# Patient Record
Sex: MALE | Race: BLACK OR AFRICAN AMERICAN | NOT HISPANIC OR LATINO | Employment: FULL TIME | URBAN - METROPOLITAN AREA
[De-identification: names, ages, dates, MRNs, and addresses within clinical notes are randomized per-mention and may not be internally consistent; named-entity substitution may affect disease eponyms.]

---

## 2019-03-17 ENCOUNTER — HOSPITAL ENCOUNTER (EMERGENCY)
Facility: HOSPITAL | Age: 29
Discharge: HOME | End: 2019-03-17
Attending: EMERGENCY MEDICINE
Payer: COMMERCIAL

## 2019-03-17 VITALS
SYSTOLIC BLOOD PRESSURE: 112 MMHG | OXYGEN SATURATION: 100 % | WEIGHT: 180 LBS | BODY MASS INDEX: 24.38 KG/M2 | TEMPERATURE: 99 F | HEART RATE: 56 BPM | HEIGHT: 72 IN | RESPIRATION RATE: 18 BRPM | DIASTOLIC BLOOD PRESSURE: 63 MMHG

## 2019-03-17 DIAGNOSIS — M20.011 ACQUIRED MALLET FINGER, RIGHT: Primary | ICD-10-CM

## 2019-03-17 DIAGNOSIS — S69.91XA INJURY OF RIGHT RING FINGER, INITIAL ENCOUNTER: ICD-10-CM

## 2019-03-17 PROCEDURE — 99284 EMERGENCY DEPT VISIT MOD MDM: CPT | Mod: 25

## 2019-03-17 PROCEDURE — 63600175 PHARM REV CODE 636 W HCPCS: Performed by: NURSE PRACTITIONER

## 2019-03-17 PROCEDURE — 29130 APPL FINGER SPLINT STATIC: CPT | Mod: RT

## 2019-03-17 RX ORDER — KETOROLAC TROMETHAMINE 30 MG/ML
30 INJECTION, SOLUTION INTRAMUSCULAR; INTRAVENOUS
Status: COMPLETED | OUTPATIENT
Start: 2019-03-17 | End: 2019-03-17

## 2019-03-17 RX ORDER — NAPROXEN 500 MG/1
500 TABLET ORAL EVERY 12 HOURS PRN
Qty: 15 TABLET | Refills: 0 | Status: SHIPPED | OUTPATIENT
Start: 2019-03-17

## 2019-03-17 RX ADMIN — KETOROLAC TROMETHAMINE 30 MG: 30 INJECTION, SOLUTION INTRAMUSCULAR; INTRAVENOUS at 08:03

## 2019-03-18 NOTE — ED TRIAGE NOTES
Pt here pt for right ring (4th) finger injury. Reports he was playing football, went to catch the ball, and dropped it. After he noticed his finger tip bent down. Unable to extend finger out all the way. Denies any pain at time of injury, no pain present now. Pt able to move extremity without pain.

## 2019-03-18 NOTE — ED PROVIDER NOTES
Encounter Date: 3/17/2019    This is a SORT/MSE of a 28 y.o. male presenting to the ED with c/o pain and injury to the right ring finger. DIP joint flexed. Able to extend it but flexes when let go. Care will be transferred to an alternate provider when patient is roomed for a full evaluation and final disposition. ALEXIS Alexander, FNP-MARIANNE 3/17/2019 8:16 PM       History     Chief Complaint   Patient presents with    Hand Injury     Pt was playing football and injured his R 5th digit. Pt unable to extend the finger.     28-year-old male presenting for evaluation of an injury to his right 4th digit.  Injury occurred while playing sports prior to arrival.  Patient states that he cannot extend his 4th finger at the DIP joint.  No additional injuries or pain to the right hand, right wrist, right elbow.  He denies head injury. No medications taken prior to arrival.          Review of patient's allergies indicates:  No Known Allergies  History reviewed. No pertinent past medical history.  History reviewed. No pertinent surgical history.  History reviewed. No pertinent family history.  Social History     Tobacco Use    Smoking status: Never Smoker    Smokeless tobacco: Never Used   Substance Use Topics    Alcohol use: Yes    Drug use: No     Review of Systems   Constitutional: Negative for chills and fever.   HENT: Negative for congestion, postnasal drip, rhinorrhea, sinus pressure and sore throat.    Eyes: Negative for pain and redness.   Respiratory: Negative for apnea, cough, choking, chest tightness, shortness of breath, wheezing and stridor.    Cardiovascular: Negative for chest pain, palpitations and leg swelling.   Gastrointestinal: Negative for abdominal pain, diarrhea, nausea and vomiting.   Genitourinary: Negative for dysuria, flank pain, penile pain and urgency.   Musculoskeletal: Positive for arthralgias (Right 4th finger). Negative for back pain, gait problem, joint swelling, myalgias, neck pain and neck  stiffness.   Skin: Negative for color change, pallor, rash and wound.   Neurological: Negative for dizziness, tremors, seizures, syncope and light-headedness.   Psychiatric/Behavioral: Negative for confusion. The patient is not nervous/anxious.        Physical Exam     Initial Vitals [03/17/19 2018]   BP Pulse Resp Temp SpO2   114/64 66 18 97.4 °F (36.3 °C) 97 %      MAP       --         Physical Exam    Vitals reviewed.  Constitutional: Vital signs are normal. He appears well-developed and well-nourished. He is not diaphoretic. He is active and cooperative.  Non-toxic appearance. He does not have a sickly appearance. He does not appear ill. No distress.   Pleasant, well appearing, in no distress   HENT:   Head: Normocephalic and atraumatic.   Right Ear: External ear normal.   Left Ear: External ear normal.   Nose: Nose normal.   Eyes: EOM are normal. Right eye exhibits no discharge. Left eye exhibits no discharge.   Neck: Normal range of motion. Neck supple. No tracheal deviation present.   Cardiovascular: Normal rate.   Pulmonary/Chest: No stridor. No respiratory distress.   Abdominal: Soft. He exhibits no distension. There is no tenderness.   Musculoskeletal: Normal range of motion. He exhibits no tenderness.        Right hand: Right ring finger: Injuries: tendon injury (Unable to extend right 4th finger at the DIP joint; flexion is normal. Capillary refill is brisk.  Tenderness overlying the DIP joint.).   Neurological: He is alert and oriented to person, place, and time. He has normal strength. No cranial nerve deficit.   Skin: Skin is warm and dry.   Psychiatric: He has a normal mood and affect. His behavior is normal. Judgment and thought content normal.         ED Course   Splint Application  Date/Time: 3/17/2019 9:18 PM  Performed by: Alex Boss NP  Authorized by: William Arevalo MD   Consent Done: Yes  Consent: Verbal consent obtained.  Risks and benefits: risks, benefits and alternatives were  "discussed  Consent given by: patient  Patient understanding: patient states understanding of the procedure being performed  Patient consent: the patient's understanding of the procedure matches consent given  Procedure consent: procedure consent matches procedure scheduled  Imaging studies: imaging studies available  Required items: required blood products, implants, devices, and special equipment available  Patient identity confirmed: , name, verbally with patient, provided demographic data and MRN  Time out: Immediately prior to procedure a "time out" was called to verify the correct patient, procedure, equipment, support staff and site/side marked as required.  Location details: right ring finger  Splint type: static finger  Supplies used: aluminum splint  Post-procedure: The splinted body part was neurovascularly unchanged following the procedure.  Patient tolerance: Patient tolerated the procedure well with no immediate complications        Labs Reviewed - No data to display       Imaging Results          X-Ray Finger 2 or More Views Right (Final result)  Result time 19 20:39:29    Final result by Kenyon Rojas MD (19 20:39:29)                 Impression:      No acute osseous abnormality identified.      Electronically signed by: Kenyon Rojas MD  Date:    2019  Time:    20:39             Narrative:    EXAMINATION:  XR FINGER 2 OR MORE VIEWS RIGHT    CLINICAL HISTORY:  finger injury;    COMPARISON:  None    FINDINGS:  No evidence of acute displaced fracture, dislocation, or osseous destructive process.  There is mild flexion seen at the 4th finger DIP joint.  No radiopaque retained foreign body seen.                                 Medical Decision Making:   History:   Old Medical Records: I decided to obtain old medical records.  Differential Diagnosis:   Fracture, dislocation, neurovascular compromise, mallet finger, flexor tenosynovitis, others  Clinical Tests:   Radiological " Study: Ordered and Reviewed  ED Management:  28-year-old male presenting for evaluation of an injury to the right 4th finger that occurred while playing sports prior to arrival.  Patient states that he cannot actively extend the distal phalanx of the right 4th finger.  Finger held in slight flexion at the affected DIP joint.  X-ray shows no evidence of fracture, dislocation, subluxation, or other acute osseous abnormality.  Findings are clinically consistent with a mallet finger.  Applied splint with finger extended at the affected joint.  See procedure note. Advised patient to follow up with Orthopedics for re-evaluation and further management.  ED return precautions given. All questions regarding diagnosis and plan were answered to the patient's fullest possible satisfaction. Patient expressed understanding of diagnosis, discharge instructions, and return precautions.    My attending physician was available for consultation during this case                      Clinical Impression:       ICD-10-CM ICD-9-CM   1. Acquired mallet finger, right M20.011 736.1   2. Injury of right ring finger, initial encounter S69.91XA 959.5         Disposition:   Disposition: Discharged  Condition: Stable                         Alxe Boss NP  03/19/19 [id number]

## 2019-03-18 NOTE — DISCHARGE INSTRUCTIONS
Scheduled appointment with Orthopedics as discussed for further treatment and testing.  Take pain medication as needed only as prescribed.  Return to the emergency department for any new or worsening symptoms or as needed for any additional concerns.    Thank you for coming to our Emergency Department today. It is important to remember that some problems are difficult to diagnose and may not be found during your first visit. Be sure to follow up with your primary care doctor.  If you do not have one, you may contact the one listed on your discharge paperwork or you may also call the [facility] Appointment Desk at [phone] to schedule an appointment with one.     Return to the ER with any questions/concerns, new/concerning symptoms, worsening or failure to improve. Do not drive or make any important decisions for 24 hours if you have received any pain medications, sedatives or mood altering drugs during your ER visit.